# Patient Record
Sex: MALE | Race: WHITE | NOT HISPANIC OR LATINO | URBAN - METROPOLITAN AREA
[De-identification: names, ages, dates, MRNs, and addresses within clinical notes are randomized per-mention and may not be internally consistent; named-entity substitution may affect disease eponyms.]

---

## 2018-04-06 ENCOUNTER — OFFICE VISIT (OUTPATIENT)
Dept: FAMILY MEDICINE CLINIC | Facility: CLINIC | Age: 58
End: 2018-04-06

## 2018-04-06 VITALS
WEIGHT: 200 LBS | DIASTOLIC BLOOD PRESSURE: 62 MMHG | HEIGHT: 70 IN | SYSTOLIC BLOOD PRESSURE: 122 MMHG | RESPIRATION RATE: 16 BRPM | HEART RATE: 75 BPM | TEMPERATURE: 98 F | BODY MASS INDEX: 28.63 KG/M2 | OXYGEN SATURATION: 98 %

## 2018-04-06 DIAGNOSIS — B00.1 HERPES LABIALIS: ICD-10-CM

## 2018-04-06 DIAGNOSIS — J01.00 ACUTE NON-RECURRENT MAXILLARY SINUSITIS: Primary | ICD-10-CM

## 2018-04-06 PROCEDURE — 99213 OFFICE O/P EST LOW 20 MIN: CPT | Performed by: PHYSICIAN ASSISTANT

## 2018-04-06 RX ORDER — PREDNISONE 20 MG/1
20 TABLET ORAL 2 TIMES DAILY
Qty: 10 TABLET | Refills: 0 | Status: SHIPPED | OUTPATIENT
Start: 2018-04-06

## 2018-04-06 RX ORDER — VALACYCLOVIR HYDROCHLORIDE 1 G/1
TABLET, FILM COATED ORAL
Qty: 8 TABLET | Refills: 3 | Status: SHIPPED | OUTPATIENT
Start: 2018-04-06

## 2018-04-06 RX ORDER — AZITHROMYCIN 250 MG/1
TABLET, FILM COATED ORAL
Qty: 6 TABLET | Refills: 0 | Status: SHIPPED | OUTPATIENT
Start: 2018-04-06 | End: 2020-11-20 | Stop reason: SDUPTHER

## 2018-04-06 NOTE — PROGRESS NOTES
Subjective   Clayton Johnson is a 57 y.o. male.   Chief Complaint   Patient presents with   • Pain     right ear       History of Present Illness     Clayton is a 57-year-old male who presents with sinus pressure,right ear pain,and head congestion for the past 6 days.  Denied any cough,shortness of air,wheezing,sore throat,post nasal drip,rhinorrhea,nausea,vomiting or diarrhea.  Appetite has been normal.  Sleep has been normal.  Art uses Valtrex for fever blisters.  Currently out of his prescription.  Thinks he is starting to get a fever blister.  Just got back from [place] on vacation.    The following portions of the patient's history were reviewed and updated as appropriate: allergies, current medications, past family history, past medical history, past social history and past surgical history.      Social History     Social History   • Marital status:      Occupational History   •       Social History Main Topics   • Smoking status: Never Smoker   • Smokeless tobacco: Never Used   • Alcohol use Yes      Comment: 1 per month   • Drug use: No   • Sexual activity: Yes     Partners: Female     Other Topics Concern   • Not on file     Review of Systems   Constitutional: Negative.  Negative for chills, fatigue and fever.   HENT: Positive for congestion, ear pain and sinus pressure. Negative for sore throat.    Eyes: Negative.    Respiratory: Negative.  Negative for cough, shortness of breath and wheezing.    Cardiovascular: Negative.  Negative for chest pain, palpitations and leg swelling.   Gastrointestinal: Negative.  Negative for constipation, diarrhea, nausea and vomiting.   Endocrine: Negative.    Genitourinary: Negative.    Musculoskeletal: Negative.    Skin: Negative.    Allergic/Immunologic: Negative.    Neurological: Negative.  Negative for headaches.   Hematological: Negative.    Psychiatric/Behavioral: Negative.    All other systems reviewed and are negative.    Vitals:    04/06/18 1441   BP: 122/62  "  BP Location: Left arm   Patient Position: Sitting   Cuff Size: Adult   Pulse: 75   Resp: 16   Temp: 98 °F (36.7 °C)   TempSrc: Oral   SpO2: 98%   Weight: 90.7 kg (200 lb)   Height: 177.8 cm (70\")       Wt Readings from Last 3 Encounters:   04/06/18 90.7 kg (200 lb)   11/14/16 86.2 kg (190 lb)   11/13/15 89.9 kg (198 lb 4.9 oz)       BP Readings from Last 3 Encounters:   04/06/18 122/62   11/14/16 114/78   11/13/15 124/82     Body mass index is 28.7 kg/m².  No Known Allergies    Objective   Physical Exam   Constitutional: He is oriented to person, place, and time. Vital signs are normal. He appears well-developed and well-nourished.   HENT:   Head: Normocephalic and atraumatic.   Right Ear: Hearing, external ear and ear canal normal. Tympanic membrane is bulging.   Left Ear: Hearing, external ear and ear canal normal. Tympanic membrane is bulging.   Nose: Rhinorrhea present. Right sinus exhibits maxillary sinus tenderness. Left sinus exhibits maxillary sinus tenderness.   Mouth/Throat: Uvula is midline and mucous membranes are normal.   1+ clear drainage   Eyes: Conjunctivae, EOM and lids are normal.   Neck: Trachea normal and phonation normal. Neck supple.   Cardiovascular: Normal rate, regular rhythm, S1 normal, S2 normal, normal heart sounds and normal pulses.    Pulmonary/Chest: Effort normal and breath sounds normal.   Abdominal: Soft. Normal appearance and bowel sounds are normal. There is no hepatomegaly. There is no tenderness.   Lymphadenopathy:     He has no cervical adenopathy.   Neurological: He is alert and oriented to person, place, and time.   Skin: Skin is warm, dry and intact.   Psychiatric: He has a normal mood and affect. His speech is normal and behavior is normal. Judgment and thought content normal. Cognition and memory are normal.       Assessment/Plan   Clayton was seen today for pain.    Diagnoses and all orders for this visit:    Acute non-recurrent maxillary sinusitis  -     azithromycin " (ZITHROMAX Z-LORENE) 250 MG tablet; Take 2 tablets the first day, then 1 tablet daily for 4 days.  -     predniSONE (DELTASONE) 20 MG tablet; Take 1 tablet by mouth 2 (Two) Times a Day.    Herpes labialis  -     valACYclovir (VALTREX) 1000 MG tablet; Take 2 pills twice a day for one day for fever blisters      1.  New Acute maxillary sinusitis: I have prescribed a Z-Lorene and a prednisone taper dose to pharmacy.  He may continue his Dayquil for now.  If his symptoms do not improve he will notify office.  2.  Chronic herpes labials: I have sent a refill of Valtrex to his pharmacy.  Art will use as directed.

## 2018-04-10 NOTE — PROGRESS NOTES
I have reviewed the notes, assessments, and/or procedures performed by Margarita Daugherty PA-C, I concur with her/his documentation of Clayton Johnson.

## 2020-03-25 ENCOUNTER — TELEPHONE (OUTPATIENT)
Dept: FAMILY MEDICINE CLINIC | Facility: CLINIC | Age: 60
End: 2020-03-25

## 2020-03-25 NOTE — TELEPHONE ENCOUNTER
Pt is a  and has been home for a week due to symptoms of the COVID 19. He is needing a note with a doctors order to self isolate in order for him to stay home and get paid. Please advise

## 2020-11-19 ENCOUNTER — TELEPHONE (OUTPATIENT)
Dept: FAMILY MEDICINE CLINIC | Facility: CLINIC | Age: 60
End: 2020-11-19

## 2020-11-19 NOTE — TELEPHONE ENCOUNTER
Pt has an appt for tomorrow with sinus drainage and dry cough and runny nose.  He went last Friday for covid test and it was negative. Wants to be seen in office. I explained we could do a video visit and he wants me to ask you if you would see him in the office?  Please advise    No fever,soa,or taste/smell

## 2020-11-20 ENCOUNTER — OFFICE VISIT (OUTPATIENT)
Dept: FAMILY MEDICINE CLINIC | Facility: CLINIC | Age: 60
End: 2020-11-20

## 2020-11-20 DIAGNOSIS — J01.00 ACUTE NON-RECURRENT MAXILLARY SINUSITIS: ICD-10-CM

## 2020-11-20 PROBLEM — Z86.73 HISTORY OF STROKE: Status: ACTIVE | Noted: 2020-07-16

## 2020-11-20 PROCEDURE — 99442 PR PHYS/QHP TELEPHONE EVALUATION 11-20 MIN: CPT | Performed by: FAMILY MEDICINE

## 2020-11-20 RX ORDER — MULTIPLE VITAMINS W/ MINERALS TAB 9MG-400MCG
1 TAB ORAL DAILY
COMMUNITY

## 2020-11-20 RX ORDER — AZITHROMYCIN 250 MG/1
TABLET, FILM COATED ORAL
Qty: 6 TABLET | Refills: 0 | Status: SHIPPED | OUTPATIENT
Start: 2020-11-20

## 2020-11-20 NOTE — PROGRESS NOTES
Subjective   Clayton Johnson is a 60 y.o. male who is here for No chief complaint on file.  .     History of Present Illness   Today is telephone medical visit between Dr Colt Salguero and current patient.  Consent is given by patient for his encounter.  This is occurring during the current COVID-19 pandemic of 2020-1; with social isolation mandates per federal and state guidelines. Patient's chart has been reviewed.  Medical history from chart is reviewed. Patient location is per their choice. Provider location is in my routine medical office in [facility].  Regarding EM coding: history will not be as robust and exam will not be possible. Most EM coding decisions will be based on MDM and time. Time included pre and post charting and time spent speaking with the patient.  15 min call.  Covid 19 is neg.    Mr. Johnson been ill now for several weeks.  He is  and has lots of exposure to Covid.  He does have chronic issues of his sinuses with infection.  Happens in spring and fall.  But just got a safe side he went and got a COVID-19 swab which was negative.  Having sinus congestion and cough some fevers as well usually a Z-Yazan helps immensely.        The following portions of the patient's history were reviewed and updated as appropriate: allergies, current medications, past family history, past medical history, past social history, past surgical history and problem list.    Review of Systems    Objective   Physical Exam    Assessment/Plan   Diagnoses and all orders for this visit:    1. Acute non-recurrent maxillary sinusitis  -     azithromycin (Zithromax Z-Yazan) 250 MG tablet; Take 2 tablets the first day, then 1 tablet daily for 4 days.  Dispense: 6 tablet; Refill: 0      There are no Patient Instructions on file for this visit.    Medications Discontinued During This Encounter   Medication Reason   • azithromycin (ZITHROMAX Z-YAZAN) 250 MG tablet Reorder        Return if symptoms worsen or fail to  improve.    Dr. Colt Salguero  [place], [place]

## 2020-11-24 ENCOUNTER — TELEPHONE (OUTPATIENT)
Dept: FAMILY MEDICINE CLINIC | Facility: CLINIC | Age: 60
End: 2020-11-24

## 2023-08-21 ENCOUNTER — OFFICE VISIT (OUTPATIENT)
Dept: FAMILY MEDICINE CLINIC | Facility: CLINIC | Age: 63
End: 2023-08-21
Payer: COMMERCIAL

## 2023-08-21 VITALS
TEMPERATURE: 97 F | HEIGHT: 70 IN | DIASTOLIC BLOOD PRESSURE: 72 MMHG | HEART RATE: 90 BPM | SYSTOLIC BLOOD PRESSURE: 120 MMHG | WEIGHT: 197 LBS | OXYGEN SATURATION: 98 % | BODY MASS INDEX: 28.2 KG/M2

## 2023-08-21 DIAGNOSIS — J06.9 UPPER RESPIRATORY TRACT INFECTION, UNSPECIFIED TYPE: Primary | ICD-10-CM

## 2023-08-21 PROCEDURE — 99213 OFFICE O/P EST LOW 20 MIN: CPT | Performed by: FAMILY MEDICINE

## 2023-08-21 RX ORDER — AMOXICILLIN 500 MG/1
500 TABLET, FILM COATED ORAL 3 TIMES DAILY
Qty: 21 TABLET | Refills: 0 | Status: SHIPPED | OUTPATIENT
Start: 2023-08-21 | End: 2023-08-28

## 2023-08-21 NOTE — LETTER
August 21, 2023     Patient: Clayton Johnson   YOB: 1960   Date of Visit: 8/21/2023       To Whom It May Concern: [employer]    It is my medical opinion that Clayton Johnson may return to work in 4 days. RTW full duty on Friday , August 25th, 2023.         Sincerely,        Colt Salguero MD    CC: No Recipients

## 2023-08-21 NOTE — PROGRESS NOTES
"  Chief Complaint   Patient presents with    Cough    URI       Upper Respiratory Infection: Patient complains of symptoms of a URI. Symptoms include congestion, cough, and sore throat. Onset of symptoms was 2 days ago, unchanged since that time. He also c/o achiness, congestion, nasal congestion, no  fever, productive cough with  green colored sputum, sinus pressure, and sore throat for the past 1 day .  He is drinking plenty of fluids. Evaluation to date: none. Treatment to date: none.  Ill contacts at home or work discussed.none  Two negative home covid swabs  Is a       Vitals:    08/21/23 1550   BP: 120/72   Pulse: 90   Temp: 97 øF (36.1 øC)   SpO2: 98%   Weight: 89.4 kg (197 lb)   Height: 177.8 cm (70\")     Gen: well appearing, alert  Ears: Tm's bulging without redness  Nose:  Congestion  Throat:  Red without exudate, some drainage, tonsils okay  Neck: no LAD  Lung:  good air movement, regular RR  Heart: RR without murmur  Skin: no rash.      Assessment & Plan   Diagnoses and all orders for this visit:    1. Upper respiratory tract infection, unspecified type (Primary)  -     amoxicillin (AMOXIL) 500 MG tablet; Take 1 tablet by mouth 3 (Three) Times a Day for 7 days.  Dispense: 21 tablet; Refill: 0             There are no Patient Instructions on file for this visit.    Tylenol or Advil as needed for pain, fever, muscle aches  Plenty of fluids  Hand washing discussed  Off work or school note given , RTW on friday  Warm tea for throat.  Pros and cons of antibiotic use discussed    Dr. Colt Salguero MD  [place], [place]  [facility]  "

## 2024-02-12 ENCOUNTER — OFFICE VISIT (OUTPATIENT)
Dept: FAMILY MEDICINE CLINIC | Facility: CLINIC | Age: 64
End: 2024-02-12
Payer: OTHER GOVERNMENT

## 2024-02-12 VITALS
TEMPERATURE: 97.9 F | OXYGEN SATURATION: 99 % | HEIGHT: 70 IN | HEART RATE: 90 BPM | DIASTOLIC BLOOD PRESSURE: 72 MMHG | WEIGHT: 197 LBS | BODY MASS INDEX: 28.2 KG/M2 | SYSTOLIC BLOOD PRESSURE: 122 MMHG

## 2024-02-12 DIAGNOSIS — R61 NIGHT SWEATS: ICD-10-CM

## 2024-02-12 DIAGNOSIS — J06.9 UPPER RESPIRATORY TRACT INFECTION, UNSPECIFIED TYPE: Primary | ICD-10-CM

## 2024-02-12 DIAGNOSIS — R42 DIZZINESS: ICD-10-CM

## 2024-02-12 DIAGNOSIS — R53.83 FATIGUE, UNSPECIFIED TYPE: ICD-10-CM

## 2024-02-12 DIAGNOSIS — R07.9 CHEST PAIN, UNSPECIFIED TYPE: ICD-10-CM

## 2024-02-12 PROCEDURE — 99214 OFFICE O/P EST MOD 30 MIN: CPT | Performed by: NURSE PRACTITIONER

## 2024-02-12 PROCEDURE — 93000 ELECTROCARDIOGRAM COMPLETE: CPT | Performed by: NURSE PRACTITIONER

## 2024-02-12 RX ORDER — FLUTICASONE PROPIONATE 50 MCG
2 SPRAY, SUSPENSION (ML) NASAL DAILY
Qty: 16 G | Refills: 0 | Status: SHIPPED | OUTPATIENT
Start: 2024-02-12

## 2024-02-13 LAB
ALBUMIN SERPL-MCNC: 4.6 G/DL (ref 3.9–4.9)
ALBUMIN/GLOB SERPL: 2 {RATIO} (ref 1.2–2.2)
ALP SERPL-CCNC: 58 IU/L (ref 44–121)
ALT SERPL-CCNC: 21 IU/L (ref 0–44)
AST SERPL-CCNC: 16 IU/L (ref 0–40)
BILIRUB SERPL-MCNC: 0.3 MG/DL (ref 0–1.2)
BUN SERPL-MCNC: 14 MG/DL (ref 8–27)
BUN/CREAT SERPL: 12 (ref 10–24)
CALCIUM SERPL-MCNC: 10.2 MG/DL (ref 8.6–10.2)
CHLORIDE SERPL-SCNC: 103 MMOL/L (ref 96–106)
CO2 SERPL-SCNC: 21 MMOL/L (ref 20–29)
CREAT SERPL-MCNC: 1.13 MG/DL (ref 0.76–1.27)
EGFRCR SERPLBLD CKD-EPI 2021: 73 ML/MIN/1.73
ERYTHROCYTE [DISTWIDTH] IN BLOOD BY AUTOMATED COUNT: 13.2 % (ref 11.6–15.4)
GLOBULIN SER CALC-MCNC: 2.3 G/DL (ref 1.5–4.5)
GLUCOSE SERPL-MCNC: 107 MG/DL (ref 70–99)
HCT VFR BLD AUTO: 46.8 % (ref 37.5–51)
HGB BLD-MCNC: 16.2 G/DL (ref 13–17.7)
MCH RBC QN AUTO: 30.5 PG (ref 26.6–33)
MCHC RBC AUTO-ENTMCNC: 34.6 G/DL (ref 31.5–35.7)
MCV RBC AUTO: 88 FL (ref 79–97)
PLATELET # BLD AUTO: 219 X10E3/UL (ref 150–450)
POTASSIUM SERPL-SCNC: 4.9 MMOL/L (ref 3.5–5.2)
PROT SERPL-MCNC: 6.9 G/DL (ref 6–8.5)
RBC # BLD AUTO: 5.32 X10E6/UL (ref 4.14–5.8)
SARS-COV-2 AB SERPL QL IA: POSITIVE
SODIUM SERPL-SCNC: 143 MMOL/L (ref 134–144)
TSH SERPL DL<=0.005 MIU/L-ACNC: 3.18 UIU/ML (ref 0.45–4.5)
WBC # BLD AUTO: 4.6 X10E3/UL (ref 3.4–10.8)

## 2024-04-16 ENCOUNTER — TELEPHONE (OUTPATIENT)
Dept: FAMILY MEDICINE CLINIC | Facility: CLINIC | Age: 64
End: 2024-04-16
Payer: OTHER GOVERNMENT

## 2024-04-16 NOTE — TELEPHONE ENCOUNTER
HUB TO RELAY:   Tried to call patient to schedule an office visit with Dr. Salguero to have his paper filled out.   The appointment must be with Dr. Salguero and no other provider.    Patient did not answer, LVM.

## 2024-04-22 ENCOUNTER — OFFICE VISIT (OUTPATIENT)
Dept: FAMILY MEDICINE CLINIC | Facility: CLINIC | Age: 64
End: 2024-04-22
Payer: OTHER GOVERNMENT

## 2024-04-22 VITALS
TEMPERATURE: 98.2 F | SYSTOLIC BLOOD PRESSURE: 124 MMHG | BODY MASS INDEX: 28.92 KG/M2 | WEIGHT: 202 LBS | DIASTOLIC BLOOD PRESSURE: 76 MMHG | OXYGEN SATURATION: 97 % | HEIGHT: 70 IN | HEART RATE: 80 BPM

## 2024-04-22 DIAGNOSIS — U09.9 COVID-19 LONG HAULER MANIFESTING CHRONIC NEUROLOGIC SYMPTOMS: Primary | ICD-10-CM

## 2024-04-22 DIAGNOSIS — R29.90 COVID-19 LONG HAULER MANIFESTING CHRONIC NEUROLOGIC SYMPTOMS: Primary | ICD-10-CM

## 2024-04-22 PROCEDURE — 99213 OFFICE O/P EST LOW 20 MIN: CPT | Performed by: FAMILY MEDICINE

## 2024-04-22 NOTE — PROGRESS NOTES
"  Subjective   Clayton Johnson is a 63 y.o. male who is here for   Chief Complaint   Patient presents with    disability paperwork   .     History of Present Illness     Patient comes in today to discuss his ongoing unfortunate disability from COVID-19.  He is currently a commercial aviation .  Contracted COVID infection back in February of this year.  Had typical URI symptoms for 1 to 2 weeks.  But unfortunately suffered continuous neurological issues of daily vertigo and daily brain fog.  He does not feel safe and competent at this time to  an airplane.  His appetite is good.  No fever.  No chest pain or shortness of breath.  No rash.  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    The following portions of the patient's history were reviewed and updated as appropriate: allergies, current medications, past family history, past medical history, past social history, past surgical history, and problem list.    Review of Systems    Objective   Vitals:    04/22/24 0748   BP: 124/76   Pulse: 80   Temp: 98.2 °F (36.8 °C)   SpO2: 97%   Weight: 91.6 kg (202 lb)   Height: 177.8 cm (70\")      Physical Exam  Vitals reviewed.   HENT:      Right Ear: Tympanic membrane normal.      Left Ear: Tympanic membrane normal.   Cardiovascular:      Rate and Rhythm: Normal rate.   Pulmonary:      Effort: Pulmonary effort is normal.   Neurological:      General: No focal deficit present.      Mental Status: He is alert.         Assessment & Plan   Diagnoses and all orders for this visit:    1. 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  Beginning back in early February.  And we will have him off for 3 more months until July 22 of this year.    There are no Patient Instructions on file for this visit.    Medications Discontinued During This Encounter   Medication Reason    fluticasone (FLONASE) 50 MCG/ACT nasal spray " *Therapy completed        No follow-ups on file.    Dr. Colt Salguero  [place], [place]

## 2024-04-23 ENCOUNTER — TELEPHONE (OUTPATIENT)
Dept: FAMILY MEDICINE CLINIC | Facility: CLINIC | Age: 64
End: 2024-04-23
Payer: OTHER GOVERNMENT

## 2024-04-23 DIAGNOSIS — I63.9 CEREBROVASCULAR ACCIDENT (CVA), UNSPECIFIED MECHANISM: Primary | ICD-10-CM

## 2024-04-23 RX ORDER — CLOPIDOGREL BISULFATE 75 MG/1
75 TABLET ORAL DAILY
Qty: 90 TABLET | Refills: 3 | Status: SHIPPED | OUTPATIENT
Start: 2024-04-23

## 2024-06-25 ENCOUNTER — OFFICE VISIT (OUTPATIENT)
Dept: FAMILY MEDICINE CLINIC | Facility: CLINIC | Age: 64
End: 2024-06-25
Payer: OTHER GOVERNMENT

## 2024-06-25 ENCOUNTER — HOSPITAL ENCOUNTER (OUTPATIENT)
Dept: GENERAL RADIOLOGY | Facility: HOSPITAL | Age: 64
Discharge: HOME | End: 2024-06-25
Admitting: FAMILY MEDICINE
Payer: OTHER GOVERNMENT

## 2024-06-25 VITALS
BODY MASS INDEX: 28.49 KG/M2 | OXYGEN SATURATION: 99 % | DIASTOLIC BLOOD PRESSURE: 80 MMHG | HEART RATE: 70 BPM | WEIGHT: 199 LBS | HEIGHT: 70 IN | TEMPERATURE: 98.2 F | SYSTOLIC BLOOD PRESSURE: 122 MMHG

## 2024-06-25 DIAGNOSIS — R10.31 RIGHT LOWER QUADRANT ABDOMINAL PAIN: ICD-10-CM

## 2024-06-25 DIAGNOSIS — R10.31 RIGHT LOWER QUADRANT ABDOMINAL PAIN: Primary | ICD-10-CM

## 2024-06-25 DIAGNOSIS — K59.00 CONSTIPATION, UNSPECIFIED CONSTIPATION TYPE: ICD-10-CM

## 2024-06-25 PROCEDURE — 74018 RADEX ABDOMEN 1 VIEW: CPT

## 2024-06-25 PROCEDURE — 99213 OFFICE O/P EST LOW 20 MIN: CPT | Performed by: FAMILY MEDICINE

## 2024-06-25 RX ORDER — POLYETHYLENE GLYCOL 3350 17 G/17G
17 POWDER, FOR SOLUTION ORAL DAILY
Qty: 510 G | Refills: 0 | Status: SHIPPED | OUTPATIENT
Start: 2024-06-25

## 2024-06-25 NOTE — PROGRESS NOTES
"  Subjective   Clayton Johnson is a 64 y.o. male who is here for   Chief Complaint   Patient presents with    Abdominal Pain     Started approx 1 week ago  Also has constipation with it  Black stool   .     Abdominal Pain  This is a new problem. The current episode started in the past 7 days. The onset quality is undetermined. The problem occurs constantly. The most recent episode lasted 24 hours. The problem has been unchanged. The pain is located in the right flank. The pain is at a severity of 1/10. The quality of the pain is dull. The abdominal pain radiates to the RLQ. Associated symptoms include constipation. Pertinent negatives include no anorexia, arthralgias, belching, diarrhea, dysuria, fever, flatus, frequency, hematochezia, hematuria, melena, myalgias, nausea, vomiting or weight loss. The pain is aggravated by palpation. The pain is relieved by Nothing. Treatments tried: Laxative. The treatment provided no relief.      Patient had 1 episode of black stools following taking a laxative. Since that time, stool has returned to normal color. No blood noted.       Review of Systems   Constitutional:  Negative for fever and weight loss.   Gastrointestinal:  Positive for abdominal pain and constipation. Negative for anorexia, diarrhea, flatus, hematochezia, melena, nausea and vomiting.   Genitourinary:  Negative for dysuria, frequency and hematuria.   Musculoskeletal:  Negative for arthralgias and myalgias.       Objective   Vitals:    06/25/24 0753   BP: 122/80   Pulse: 70   Temp: 98.2 °F (36.8 °C)   SpO2: 99%   Weight: 90.3 kg (199 lb)   Height: 177.8 cm (70\")      Physical Exam  Vitals and nursing note reviewed.   Constitutional:       Appearance: Normal appearance. He is normal weight.   HENT:      Head: Normocephalic and atraumatic.   Cardiovascular:      Rate and Rhythm: Normal rate and regular rhythm.      Pulses: Normal pulses.      Heart sounds: No murmur heard.  Pulmonary:      Effort: Pulmonary effort " is normal. No respiratory distress.      Breath sounds: Normal breath sounds. No wheezing.   Abdominal:      General: Abdomen is flat. Bowel sounds are decreased.      Tenderness: There is abdominal tenderness in the right lower quadrant. There is no right CVA tenderness, left CVA tenderness, guarding or rebound. Negative signs include Limon's sign and McBurney's sign.       Skin:     General: Skin is warm and dry.   Neurological:      General: No focal deficit present.      Mental Status: He is alert.   Psychiatric:         Mood and Affect: Mood normal.         Thought Content: Thought content normal.         Assessment & Plan   Diagnoses and all orders for this visit:    1. Right lower quadrant abdominal pain (Primary)  Will obtain x-ray of abdomen.  Exam consistent with constipation.  Will start MiraLAX 1 capful daily.  Follow-up in 1 week.  -     XR Abdomen KUB; Future  -     polyethylene glycol (MiraLax) 17 GM/SCOOP powder; Take 17 g by mouth Daily.  Dispense: 510 g; Refill: 0    2. Constipation, unspecified constipation type  Will obtain x-ray of abdomen.  Exam consistent with constipation.  Will start MiraLAX 1 capful daily.  Follow-up in 1 week.  -     polyethylene glycol (MiraLax) 17 GM/SCOOP powder; Take 17 g by mouth Daily.  Dispense: 510 g; Refill: 0        There are no Patient Instructions on file for this visit.    There are no discontinued medications.     Return in about 1 week (around 7/2/2024), or Constipation.    Chevy Ruggiero MD  [place], [place]    Answers submitted by the patient for this visit:  Primary Reason for Visit (Submitted on 6/24/2024)  What is the primary reason for your visit?: Abdominal Pain

## 2024-06-25 NOTE — PROGRESS NOTES
Xray revealed a large stool burden. This is likely related to constipation. Start Miralax daily. Follow up in 1 week or sooner if symptoms worsen.

## 2024-07-19 ENCOUNTER — OFFICE VISIT (OUTPATIENT)
Dept: FAMILY MEDICINE CLINIC | Facility: CLINIC | Age: 64
End: 2024-07-19
Payer: OTHER GOVERNMENT

## 2024-07-19 DIAGNOSIS — U09.9 COVID-19 LONG HAULER MANIFESTING CHRONIC NEUROLOGIC SYMPTOMS: Primary | ICD-10-CM

## 2024-07-19 DIAGNOSIS — R29.90 COVID-19 LONG HAULER MANIFESTING CHRONIC NEUROLOGIC SYMPTOMS: Primary | ICD-10-CM

## 2024-07-19 PROCEDURE — 99213 OFFICE O/P EST LOW 20 MIN: CPT | Performed by: FAMILY MEDICINE

## 2024-07-22 VITALS
SYSTOLIC BLOOD PRESSURE: 110 MMHG | WEIGHT: 197 LBS | BODY MASS INDEX: 28.2 KG/M2 | DIASTOLIC BLOOD PRESSURE: 70 MMHG | TEMPERATURE: 97.3 F | HEIGHT: 70 IN | OXYGEN SATURATION: 99 % | HEART RATE: 79 BPM

## 2024-07-22 NOTE — PROGRESS NOTES
"  Subjective   Clayton Johnson is a 64 y.o. male who is here for   Chief Complaint   Patient presents with    Fatigue   .   Answers submitted by the patient for this visit:  Primary Reason for Visit (Submitted on 7/19/2024)  What is the primary reason for your visit?: Other  Other (Submitted on 7/19/2024)  Please describe your symptoms.: None. Required disability followup/paperwork.  Have you had these symptoms before?: No  How long have you been having these symptoms?: 1-4 days  Please list any medications you are currently taking for this condition.: N/A  Please describe any probable cause for these symptoms. : N/A    Fatigue  Associated symptoms include fatigue.        Clayton comes in today to follow-up on his continued long-term neurological deficits from COVID-19 infection.  Still having general fatigue issues  Still unable to multitask.  Having problems such as simple math problems.  He is a .  He still feels he is unable to safely and completely fulfill all the requirements of a .  He will continue on with his short-term disability.      The following portions of the patient's history were reviewed and updated as appropriate: allergies, current medications, past family history, past medical history, past social history, past surgical history, and problem list.    Review of Systems   Constitutional:  Positive for fatigue.       Objective   Vitals:    07/22/24 0940   BP: 110/70   Pulse: 79   Temp: 97.3 °F (36.3 °C)   SpO2: 99%   Weight: 89.4 kg (197 lb)   Height: 177.8 cm (70\")      Physical Exam  Vitals reviewed.   Cardiovascular:      Rate and Rhythm: Normal rate.   Pulmonary:      Effort: Pulmonary effort is normal.   Neurological:      Mental Status: He is alert.   Psychiatric:         Mood and Affect: Mood normal.         Speech: Speech normal.         Behavior: Behavior is cooperative.         Cognition and Memory: Cognition is impaired. Memory is not impaired. "         Assessment & Plan   Diagnoses and all orders for this visit:    1. COVID-19 long hauler manifesting chronic neurologic symptoms (Primary)    Continue his short-term disability and FMLA.  For the rest of the year.    There are no Patient Instructions on file for this visit.    There are no discontinued medications.     Return in about 6 months (around 1/19/2025).    Dr. Colt Salguero  [place], [place]